# Patient Record
Sex: FEMALE | Race: WHITE | Employment: FULL TIME | URBAN - METROPOLITAN AREA
[De-identification: names, ages, dates, MRNs, and addresses within clinical notes are randomized per-mention and may not be internally consistent; named-entity substitution may affect disease eponyms.]

---

## 2022-02-07 LAB
CHLAMYDIA, EXTERNAL: NEGATIVE
N. GONORRHEA, EXTERNAL: NEGATIVE

## 2022-02-16 LAB
ANTIBODY SCREEN, EXTERNAL: NEGATIVE
HBSAG, EXTERNAL: NEGATIVE
HIV, EXTERNAL: NEGATIVE
RPR, EXTERNAL: NONREACTIVE
RUBELLA, EXTERNAL: NORMAL
TYPE, ABO & RH, EXTERNAL: NORMAL

## 2022-03-02 ENCOUNTER — HOSPITAL ENCOUNTER (OUTPATIENT)
Dept: PERINATAL CARE | Age: 42
Discharge: HOME | End: 2022-03-02
Attending: OBSTETRICS & GYNECOLOGY
Payer: COMMERCIAL

## 2022-03-02 PROCEDURE — 76813 OB US NUCHAL MEAS 1 GEST: CPT | Performed by: OBSTETRICS & GYNECOLOGY

## 2022-04-22 ENCOUNTER — HOSPITAL ENCOUNTER (OUTPATIENT)
Dept: PERINATAL CARE | Age: 42
Discharge: HOME | End: 2022-04-22
Attending: OBSTETRICS & GYNECOLOGY
Payer: COMMERCIAL

## 2022-04-22 PROCEDURE — 76811 OB US DETAILED SNGL FETUS: CPT | Performed by: OBSTETRICS & GYNECOLOGY

## 2022-06-02 ENCOUNTER — HOSPITAL ENCOUNTER (OUTPATIENT)
Dept: PERINATAL CARE | Age: 42
Discharge: HOME | End: 2022-06-02
Attending: OBSTETRICS & GYNECOLOGY
Payer: COMMERCIAL

## 2022-06-02 PROCEDURE — 76816 OB US FOLLOW-UP PER FETUS: CPT | Performed by: OBSTETRICS & GYNECOLOGY

## 2022-07-12 ENCOUNTER — HOSPITAL ENCOUNTER (OUTPATIENT)
Dept: PERINATAL CARE | Age: 42
Discharge: HOME | End: 2022-07-12
Attending: OBSTETRICS & GYNECOLOGY
Payer: COMMERCIAL

## 2022-07-12 PROCEDURE — 76816 OB US FOLLOW-UP PER FETUS: CPT | Performed by: OBSTETRICS & GYNECOLOGY

## 2022-08-23 ENCOUNTER — HOSPITAL ENCOUNTER (OUTPATIENT)
Dept: PERINATAL CARE | Age: 42
Discharge: HOME | End: 2022-08-23
Attending: OBSTETRICS & GYNECOLOGY
Payer: COMMERCIAL

## 2022-08-23 PROCEDURE — 76816 OB US FOLLOW-UP PER FETUS: CPT | Performed by: OBSTETRICS & GYNECOLOGY

## 2022-08-24 LAB — GRBS, EXTERNAL: NEGATIVE

## 2022-08-30 ENCOUNTER — HOSPITAL ENCOUNTER (OUTPATIENT)
Dept: PERINATAL CARE | Age: 42
Discharge: HOME | End: 2022-08-30
Attending: OBSTETRICS & GYNECOLOGY
Payer: COMMERCIAL

## 2022-08-30 PROCEDURE — 76819 FETAL BIOPHYS PROFIL W/O NST: CPT | Performed by: OBSTETRICS & GYNECOLOGY

## 2022-09-08 ENCOUNTER — HOSPITAL ENCOUNTER (OUTPATIENT)
Dept: PERINATAL CARE | Age: 42
Discharge: HOME | End: 2022-09-08
Attending: OBSTETRICS & GYNECOLOGY
Payer: COMMERCIAL

## 2022-09-08 PROCEDURE — 76820 UMBILICAL ARTERY ECHO: CPT | Performed by: OBSTETRICS & GYNECOLOGY

## 2022-09-08 PROCEDURE — 76819 FETAL BIOPHYS PROFIL W/O NST: CPT | Performed by: OBSTETRICS & GYNECOLOGY

## 2022-09-16 ENCOUNTER — HOSPITAL ENCOUNTER (OUTPATIENT)
Dept: PERINATAL CARE | Age: 42
Discharge: HOME | End: 2022-09-16
Attending: OBSTETRICS & GYNECOLOGY
Payer: COMMERCIAL

## 2022-09-16 ENCOUNTER — HOSPITAL ENCOUNTER (INPATIENT)
Age: 42
LOS: 4 days | Discharge: HOME | End: 2022-09-20
Attending: STUDENT IN AN ORGANIZED HEALTH CARE EDUCATION/TRAINING PROGRAM | Admitting: OBSTETRICS & GYNECOLOGY
Payer: COMMERCIAL

## 2022-09-16 PROBLEM — Z34.90 PREGNANCY: Status: ACTIVE | Noted: 2022-09-16

## 2022-09-16 LAB
ABO + RH BLD: NORMAL
ALBUMIN SERPL-MCNC: 2.5 G/DL (ref 3.5–5)
ALBUMIN/GLOB SERPL: 0.7 {RATIO} (ref 1.1–2.2)
ALP SERPL-CCNC: 140 U/L (ref 45–117)
ALT SERPL-CCNC: 22 U/L (ref 12–78)
ANION GAP SERPL CALC-SCNC: 9 MMOL/L (ref 5–15)
AST SERPL-CCNC: 16 U/L (ref 15–37)
BASOPHILS # BLD: 0 K/UL (ref 0–0.1)
BASOPHILS NFR BLD: 0 % (ref 0–1)
BILIRUB SERPL-MCNC: 0.2 MG/DL (ref 0.2–1)
BLOOD GROUP ANTIBODIES SERPL: NORMAL
BUN SERPL-MCNC: 15 MG/DL (ref 6–20)
BUN/CREAT SERPL: 18 (ref 12–20)
CALCIUM SERPL-MCNC: 8.8 MG/DL (ref 8.5–10.1)
CHLORIDE SERPL-SCNC: 112 MMOL/L (ref 97–108)
CO2 SERPL-SCNC: 21 MMOL/L (ref 21–32)
CREAT SERPL-MCNC: 0.85 MG/DL (ref 0.55–1.02)
DIFFERENTIAL METHOD BLD: ABNORMAL
EOSINOPHIL # BLD: 0.1 K/UL (ref 0–0.4)
EOSINOPHIL NFR BLD: 1 % (ref 0–7)
ERYTHROCYTE [DISTWIDTH] IN BLOOD BY AUTOMATED COUNT: 14 % (ref 11.5–14.5)
GLOBULIN SER CALC-MCNC: 3.5 G/DL (ref 2–4)
GLUCOSE SERPL-MCNC: 126 MG/DL (ref 65–100)
HCT VFR BLD AUTO: 34.9 % (ref 35–47)
HGB BLD-MCNC: 12 G/DL (ref 11.5–16)
IMM GRANULOCYTES # BLD AUTO: 0.1 K/UL (ref 0–0.04)
IMM GRANULOCYTES NFR BLD AUTO: 1 % (ref 0–0.5)
LDH SERPL L TO P-CCNC: 105 U/L (ref 81–246)
LYMPHOCYTES # BLD: 1.7 K/UL (ref 0.8–3.5)
LYMPHOCYTES NFR BLD: 18 % (ref 12–49)
MCH RBC QN AUTO: 31.9 PG (ref 26–34)
MCHC RBC AUTO-ENTMCNC: 34.4 G/DL (ref 30–36.5)
MCV RBC AUTO: 92.8 FL (ref 80–99)
MONOCYTES # BLD: 0.6 K/UL (ref 0–1)
MONOCYTES NFR BLD: 6 % (ref 5–13)
NEUTS SEG # BLD: 7.3 K/UL (ref 1.8–8)
NEUTS SEG NFR BLD: 75 % (ref 32–75)
NRBC # BLD: 0 K/UL (ref 0–0.01)
NRBC BLD-RTO: 0 PER 100 WBC
PLATELET # BLD AUTO: 179 K/UL (ref 150–400)
PMV BLD AUTO: 11.9 FL (ref 8.9–12.9)
POTASSIUM SERPL-SCNC: 4 MMOL/L (ref 3.5–5.1)
PROT SERPL-MCNC: 6 G/DL (ref 6.4–8.2)
RBC # BLD AUTO: 3.76 M/UL (ref 3.8–5.2)
SODIUM SERPL-SCNC: 142 MMOL/L (ref 136–145)
SPECIMEN EXP DATE BLD: NORMAL
URATE SERPL-MCNC: 6.6 MG/DL (ref 2.6–6)
WBC # BLD AUTO: 9.8 K/UL (ref 3.6–11)

## 2022-09-16 PROCEDURE — 76818 FETAL BIOPHYS PROFILE W/NST: CPT | Performed by: OBSTETRICS & GYNECOLOGY

## 2022-09-16 PROCEDURE — 75410000002 HC LABOR FEE PER 1 HR: Performed by: OBSTETRICS & GYNECOLOGY

## 2022-09-16 PROCEDURE — 74011250637 HC RX REV CODE- 250/637: Performed by: OBSTETRICS & GYNECOLOGY

## 2022-09-16 PROCEDURE — 85025 COMPLETE CBC W/AUTO DIFF WBC: CPT

## 2022-09-16 PROCEDURE — 83615 LACTATE (LD) (LDH) ENZYME: CPT

## 2022-09-16 PROCEDURE — 86900 BLOOD TYPING SEROLOGIC ABO: CPT

## 2022-09-16 PROCEDURE — 65270000029 HC RM PRIVATE

## 2022-09-16 PROCEDURE — 59200 INSERT CERVICAL DILATOR: CPT | Performed by: OBSTETRICS & GYNECOLOGY

## 2022-09-16 PROCEDURE — 84550 ASSAY OF BLOOD/URIC ACID: CPT

## 2022-09-16 PROCEDURE — 80053 COMPREHEN METABOLIC PANEL: CPT

## 2022-09-16 PROCEDURE — 76816 OB US FOLLOW-UP PER FETUS: CPT | Performed by: OBSTETRICS & GYNECOLOGY

## 2022-09-16 PROCEDURE — 36415 COLL VENOUS BLD VENIPUNCTURE: CPT

## 2022-09-16 RX ORDER — OXYTOCIN/RINGER'S LACTATE 30/500 ML
0-20 PLASTIC BAG, INJECTION (ML) INTRAVENOUS
Status: DISCONTINUED | OUTPATIENT
Start: 2022-09-17 | End: 2022-09-18 | Stop reason: HOSPADM

## 2022-09-16 RX ORDER — CETIRIZINE HCL 10 MG
TABLET ORAL
COMMUNITY

## 2022-09-16 RX ORDER — PROCHLORPERAZINE EDISYLATE 5 MG/ML
10 INJECTION INTRAMUSCULAR; INTRAVENOUS
Status: DISCONTINUED | OUTPATIENT
Start: 2022-09-16 | End: 2022-09-20 | Stop reason: HOSPADM

## 2022-09-16 RX ORDER — ENOXAPARIN SODIUM 100 MG/ML
40 INJECTION SUBCUTANEOUS
COMMUNITY

## 2022-09-16 RX ORDER — BUTORPHANOL TARTRATE 2 MG/ML
2 INJECTION INTRAMUSCULAR; INTRAVENOUS
Status: DISCONTINUED | OUTPATIENT
Start: 2022-09-16 | End: 2022-09-20 | Stop reason: HOSPADM

## 2022-09-16 RX ORDER — ASPIRIN 81 MG/1
TABLET ORAL DAILY
COMMUNITY

## 2022-09-16 RX ADMIN — Medication 25 MCG: at 23:38

## 2022-09-16 RX ADMIN — Medication 25 MCG: at 21:35

## 2022-09-16 NOTE — PROGRESS NOTES
[id number] - Patient arrived to labor and delivery, changing in room. R Matt Reynoso 20 changing and placed on monitor. 1 - Consents signed and placed on chart with birth plan. Questions answered. 1545 - Bedside shift change report given to [address] [place] (oncoming nurse) by Antonino Snyder (offgoing nurse). Report included the following information SBAR.

## 2022-09-16 NOTE — PROGRESS NOTES
1900 Bedside and Verbal shift change report given to Esau Rahman (oncoming nurse) by Bill Leone (offgoing nurse). Report included the following information SBAR, Kardex, Procedure Summary, Intake/Output, MAR, and Recent Results. 2035 RN at bedside. Dr Laura Hwang MD in room to assess pt.     2200 Bedside and Verbal shift change report given to Delmy (oncoming nurse) by Esau Rahman (offgoing nurse). Report included the following information SBAR, Kardex, Procedure Summary, Intake/Output, MAR, and Recent Results.

## 2022-09-16 NOTE — PROGRESS NOTES
[address] received from Rajesh Maloney RN    41 Chris Rock MD at bedside. SVE 1cm. Abdalla bulb placed 60/60. VORB miso 7p, 9pm, 11pm, and pitocin 2mL/hr q30 starting at 0500. MD would like continuous monitoring    1908 Bedside and Verbal shift change report given to Harsh Dickens RN (oncoming nurse) by Jameel Martinez RN  (offgoing nurse). Report included the following information SBAR, MAR, and Med Rec Status.

## 2022-09-16 NOTE — H&P
History & Physical    Name: Maddison Hannon MRN: [id number]  SSN: [id number]    YOB: 1980  Age: 46 y.o. Sex: female      Subjective:     Estimated Date of Delivery: 22  OB History    Para Term  AB Living   1             SAB IAB Ectopic Molar Multiple Live Births                    # Outcome Date GA Lbr Duane/2nd Weight Sex Delivery Anes PTL Lv   1 Current                Ms. Sohail Arce is admitted with pregnancy at 39w4d for induction of labor due to BPP 10 at term. Prenatal course was complicated by  history of epilepsy, CVA, prothrombin G heterozygous mutation, AMA . Pt has been on Lovenox for the pregnancy 40mg once daily. Last dose 9/15 at night. Please see prenatal records for details. Pt seen for routine visit today at MFM and BPP 6/10, minus 2 for fetal movement and NST. Pt feeling good FM now. Past Medical History:   Diagnosis Date    Abnormal Papanicolaou smear of cervix     Disease of blood and blood forming organ     Epilepsy [facility])     Psychiatric problem      Past Surgical History:   Procedure Laterality Date    HX OTHER SURGICAL       Social History     Occupational History    Not on file   Tobacco Use    Smoking status: Never     Passive exposure: Never    Smokeless tobacco: Never   Substance and Sexual Activity    Alcohol use: Never    Drug use: Not on file    Sexual activity: Not on file     No family history on file. No Known Allergies  Prior to Admission medications    Medication Sig Start Date End Date Taking? Authorizing Provider   aspirin delayed-release 81 mg tablet Take  by mouth daily. Yes Provider, Historical   cetirizine (ZyrTEC) 10 mg tablet Take  by mouth. Yes Provider, Historical   enoxaparin (Lovenox) 40 mg/0.4 mL 40 mg by SubCUTAneous route. Yes Provider, Historical        Review of Systems: Pertinent items are noted in the History of Present Illness.     Objective:     Vitals:  Vitals:    22 1457 22 1527   BP: 136/84    Pulse: 100    Resp: 22    Weight:  94.3 kg (208 lb)   Height:  5' 5\" (1.651 m)        Physical Exam:  Close/soft/high  Cook placed 60/60  Membranes:  Intact  Fetal Heart Rate: Reactive          Prenatal Labs:   No results found for: ABORH, RUBELLAEXT, HBSAGEXT, HIVEXT, RPREXT, GONNOEXT, CHLAMEXT, ABORHEXT, RUBELLAEXT, GRBSEXT, HBSAGEXT, HIVEXT, RPREXT, GONNOEXT, CHLAMEXT    Impression/Plan:  IUP at 39w4d IOL for nonreassuring  testing at term     Active Problems:    * No active hospital problems. *       Plan:    Admit for induction of labor. 2.  Group B Strep negative. 3.  Lovenox held, plan restart postpartum  4. Cook placed.   Oral miso 25mg po q 2 hrs x 3 doses then pit at 5am.  Pain meds prn.  5.  Pt not anemic    Brandi Pina DO, [address] [facility],[address] [facility]

## 2022-09-17 LAB
CREAT UR-MCNC: 256 MG/DL
PROT UR-MCNC: 54 MG/DL (ref 0–11.9)
PROT/CREAT UR-RTO: 0.2

## 2022-09-17 PROCEDURE — 84156 ASSAY OF PROTEIN URINE: CPT

## 2022-09-17 PROCEDURE — 74011250637 HC RX REV CODE- 250/637: Performed by: OBSTETRICS & GYNECOLOGY

## 2022-09-17 PROCEDURE — 75410000002 HC LABOR FEE PER 1 HR: Performed by: OBSTETRICS & GYNECOLOGY

## 2022-09-17 PROCEDURE — 74011250636 HC RX REV CODE- 250/636: Performed by: OBSTETRICS & GYNECOLOGY

## 2022-09-17 PROCEDURE — 65270000029 HC RM PRIVATE

## 2022-09-17 RX ORDER — SODIUM CHLORIDE, SODIUM LACTATE, POTASSIUM CHLORIDE, CALCIUM CHLORIDE 600; 310; 30; 20 MG/100ML; MG/100ML; MG/100ML; MG/100ML
125 INJECTION, SOLUTION INTRAVENOUS CONTINUOUS
Status: DISCONTINUED | OUTPATIENT
Start: 2022-09-17 | End: 2022-09-20

## 2022-09-17 RX ORDER — SODIUM CHLORIDE 0.9 % (FLUSH) 0.9 %
5-40 SYRINGE (ML) INJECTION AS NEEDED
Status: DISCONTINUED | OUTPATIENT
Start: 2022-09-17 | End: 2022-09-20 | Stop reason: HOSPADM

## 2022-09-17 RX ORDER — SODIUM CHLORIDE 0.9 % (FLUSH) 0.9 %
5-40 SYRINGE (ML) INJECTION EVERY 8 HOURS
Status: DISCONTINUED | OUTPATIENT
Start: 2022-09-17 | End: 2022-09-20

## 2022-09-17 RX ADMIN — Medication 25 MCG: at 02:37

## 2022-09-17 RX ADMIN — Medication 25 MCG: at 15:03

## 2022-09-17 RX ADMIN — SODIUM CHLORIDE, POTASSIUM CHLORIDE, SODIUM LACTATE AND CALCIUM CHLORIDE 125 ML/HR: 600; 310; 30; 20 INJECTION, SOLUTION INTRAVENOUS at 05:15

## 2022-09-17 RX ADMIN — OXYTOCIN 2 MILLI-UNITS/MIN: 10 INJECTION INTRAVENOUS at 05:17

## 2022-09-17 RX ADMIN — Medication 25 MCG: at 12:53

## 2022-09-17 RX ADMIN — Medication 25 MCG: at 11:07

## 2022-09-17 NOTE — PROGRESS NOTES
Labor Note    [facility]  [id number]  1980   39w5d      S:  Feeling some mild cramping with contractions    O:    Visit Vitals  /68   Pulse 80   Temp 97.8 °F (36.6 °C)   Resp 16   Ht 5' 5\" (1.651 m)   Wt 94.3 kg (208 lb)   SpO2 99%   BMI 34.61 kg/m²     Cervical Exam  Dilation (cm): 2.5  Eff: 90 %  Station: -3  Vaginal exam done by? : Dr. Placido Gonzales Status: Intact    Patient Vitals for the past 4 hrs: Mode Fetal Heart Rate Variability Decelerations Accelerations RN Reviewed Strip?   22 0900 External 140 6-25 BPM None No Yes   22 0845 -- -- -- -- -- Yes   22 0830 External 140 6-25 BPM None No Yes   22 0815 -- -- -- -- -- Yes   22 0800 External 140 6-25 BPM None Yes Yes   22 0745 -- -- -- -- -- Yes   22 0730 External 135 6-25 BPM None Yes Yes   22 0715 -- -- -- -- -- Yes   22 0700 External 130 6-25 BPM None Yes Yes   22 0630 External 135 6-25 BPM None Yes Yes       A/P:  46 y.o.  @ 39w5d- IOL nonreassuring  testing  - hx CVA and prothrombin G mutation on lovenox. Last dose 9/15 pm. SCDs and restart lovenox 12-24hrs post vaginal, 24hrs post cs  1. CEFM/[place]  2. GBS neg / Rh pos  3. Pitocin was at 8. Will stop and give 2 doses of miso  4. Pain control - epidural as needed  5. Ludy 4-6 hours, or prn. Expect .       Yamileth Santiago MD  [facility]

## 2022-09-17 NOTE — PROGRESS NOTES
Labor Progress Note  Patient seen, fetal heart rate and contraction pattern evaluated, patient examined.   Visit Vitals  /89   Pulse 84   Temp 97.8 °F (36.6 °C)   Resp 16   Ht 5' 5\" (1.651 m)   Wt 208 lb (94.3 kg)   SpO2 99%   BMI 34.61 kg/m²       Physical Exam:  Cervical Exam:  2/60 %/-3/  nper nursing  Membranes:  Intact  Uterine Activity: Frequency: Every 3-7 minutes  Fetal Heart Rate: Reactive    Assessment/Plan:  Reassuring fetal status  Patient has declined intervention - declined AROM, replacement of cook  Received Cytotec x 3 - had some mild contractions  Disc options with patient - will rest, allow to eat, restart pitocin at midnite  Disc MD will likely AROM tomororw am - this will give her plenty of time to prepare herself for this procedure        Tanika Bryant MD  [facility] Hospitalist  [facility]

## 2022-09-17 NOTE — PROGRESS NOTES
12: SBAR report received from AGUS Rivers RN.    9407Roselyn Mary balloon deflated and removed without difficulty. 4485: Dr. Talia Blum at bedside. SVE 2.5/90/-3. MD discussing with patient plan to stop Pitocin, eat breakfast, shower, and restart oral miso. Pt in agreement with plan of care. 0930: Pt sitting up in bed eating. Pt plans to shower after breakfast. Notified pt will reapply EFM at 1030 prior to starting oral cytotec. 1030: Pt in shower at this time. 1047: Pt back in bed after shower. EFM reapplied. 1601: Pt assisted to sitting on the birthing ball at side of the bed.     1700: SVE done. Discussed with patient options for management of induction. Advised pt that a repeat cook catheter may be helpful r/t minimal dilation/effacement. Pt declines cook catheter. Pt declines AROM at this time. Pt is amenable to continuing cytotec induction or starting Pitocin. 1705: Dr. Edilberto Medley notified of repeat SVE and no cervical change noted. MD advised of pt's desires r/t method of induction. TORB for pt to eat dinner, shower after 7 p.m. and rest then restart Pitocin at 0000.    1900: SBAR report given to AGUS Rivers RN

## 2022-09-17 NOTE — PROGRESS NOTES
Labor Progress Note  Patient seen, fetal heart rate and contraction pattern evaluated, patient examined. Visit Vitals  /68   Pulse 80   Temp 97.8 °F (36.6 °C)   Resp 16   Ht 5' 5\" (1.651 m)   Wt 94.3 kg (208 lb)   SpO2 99%   BMI 34.61 kg/m²       Physical Exam:  Cervical Exam:  2.5/90 %/-3/  Dr. Fifi Gimenez  Membranes:  Intact  Uterine Activity: Frequency: Every 3-4 minutes  Fetal Heart Rate: Reactive    Assessment/Plan:  Has received one dose of 25mcg cytotec. Disc AROM, epidural   IOL nonreassuring  testing  - hx CVA and prothrombin G mutation on lovenox. Last dose 9/15 pm. SCDs and restart lovenox 12-24hrs post vaginal, 24hrs post cs  1. CEFM/[place]  2.   GBS neg / Rh pos

## 2022-09-18 ENCOUNTER — ANESTHESIA EVENT (OUTPATIENT)
Dept: LABOR AND DELIVERY | Age: 42
End: 2022-09-18
Payer: COMMERCIAL

## 2022-09-18 ENCOUNTER — ANESTHESIA (OUTPATIENT)
Dept: LABOR AND DELIVERY | Age: 42
End: 2022-09-18
Payer: COMMERCIAL

## 2022-09-18 PROBLEM — O36.8390 NON-REASSURING ELECTRONIC FETAL MONITORING TRACING: Status: ACTIVE | Noted: 2022-09-18

## 2022-09-18 LAB
GLUCOSE BLD STRIP.AUTO-MCNC: 90 MG/DL (ref 65–117)
SERVICE CMNT-IMP: NORMAL

## 2022-09-18 PROCEDURE — 74011000250 HC RX REV CODE- 250

## 2022-09-18 PROCEDURE — 76010000392 HC C SECN EA ADDL 0.5 HR: Performed by: OBSTETRICS & GYNECOLOGY

## 2022-09-18 PROCEDURE — 10907ZC DRAINAGE OF AMNIOTIC FLUID, THERAPEUTIC FROM PRODUCTS OF CONCEPTION, VIA NATURAL OR ARTIFICIAL OPENING: ICD-10-PCS | Performed by: OBSTETRICS & GYNECOLOGY

## 2022-09-18 PROCEDURE — 74011000250 HC RX REV CODE- 250: Performed by: ANESTHESIOLOGY

## 2022-09-18 PROCEDURE — 74011250636 HC RX REV CODE- 250/636

## 2022-09-18 PROCEDURE — 82962 GLUCOSE BLOOD TEST: CPT

## 2022-09-18 PROCEDURE — 75410000002 HC LABOR FEE PER 1 HR: Performed by: OBSTETRICS & GYNECOLOGY

## 2022-09-18 PROCEDURE — 65270000029 HC RM PRIVATE

## 2022-09-18 PROCEDURE — 75410000003 HC RECOV DEL/VAG/CSECN EA 0.5 HR: Performed by: OBSTETRICS & GYNECOLOGY

## 2022-09-18 PROCEDURE — 74011000250 HC RX REV CODE- 250: Performed by: OBSTETRICS & GYNECOLOGY

## 2022-09-18 PROCEDURE — 74011250636 HC RX REV CODE- 250/636: Performed by: OBSTETRICS & GYNECOLOGY

## 2022-09-18 PROCEDURE — 77030007866 HC KT SPN ANES BBMI -B: Performed by: ANESTHESIOLOGY

## 2022-09-18 PROCEDURE — 76010000391 HC C SECN FIRST 1 HR: Performed by: OBSTETRICS & GYNECOLOGY

## 2022-09-18 PROCEDURE — 74011250636 HC RX REV CODE- 250/636: Performed by: ANESTHESIOLOGY

## 2022-09-18 PROCEDURE — 76060000078 HC EPIDURAL ANESTHESIA: Performed by: OBSTETRICS & GYNECOLOGY

## 2022-09-18 RX ORDER — IBUPROFEN 800 MG/1
800 TABLET ORAL
Status: DISCONTINUED | OUTPATIENT
Start: 2022-09-18 | End: 2022-09-20 | Stop reason: HOSPADM

## 2022-09-18 RX ORDER — NALOXONE HYDROCHLORIDE 0.4 MG/ML
0.4 INJECTION, SOLUTION INTRAMUSCULAR; INTRAVENOUS; SUBCUTANEOUS AS NEEDED
Status: DISCONTINUED | OUTPATIENT
Start: 2022-09-18 | End: 2022-09-20 | Stop reason: HOSPADM

## 2022-09-18 RX ORDER — NALBUPHINE HYDROCHLORIDE 10 MG/ML
10 INJECTION, SOLUTION INTRAMUSCULAR; INTRAVENOUS; SUBCUTANEOUS
Status: DISCONTINUED | OUTPATIENT
Start: 2022-09-18 | End: 2022-09-18 | Stop reason: HOSPADM

## 2022-09-18 RX ORDER — SIMETHICONE 80 MG
80 TABLET,CHEWABLE ORAL AS NEEDED
Status: DISCONTINUED | OUTPATIENT
Start: 2022-09-18 | End: 2022-09-20 | Stop reason: HOSPADM

## 2022-09-18 RX ORDER — ZOLPIDEM TARTRATE 5 MG/1
5 TABLET ORAL
Status: DISCONTINUED | OUTPATIENT
Start: 2022-09-18 | End: 2022-09-20 | Stop reason: HOSPADM

## 2022-09-18 RX ORDER — OXYTOCIN/RINGER'S LACTATE 30/500 ML
10 PLASTIC BAG, INJECTION (ML) INTRAVENOUS AS NEEDED
Status: DISCONTINUED | OUTPATIENT
Start: 2022-09-18 | End: 2022-09-20 | Stop reason: HOSPADM

## 2022-09-18 RX ORDER — SODIUM CHLORIDE, SODIUM LACTATE, POTASSIUM CHLORIDE, CALCIUM CHLORIDE 600; 310; 30; 20 MG/100ML; MG/100ML; MG/100ML; MG/100ML
125 INJECTION, SOLUTION INTRAVENOUS CONTINUOUS
Status: DISCONTINUED | OUTPATIENT
Start: 2022-09-18 | End: 2022-09-20

## 2022-09-18 RX ORDER — ONDANSETRON 2 MG/ML
4 INJECTION INTRAMUSCULAR; INTRAVENOUS
Status: DISCONTINUED | OUTPATIENT
Start: 2022-09-18 | End: 2022-09-20 | Stop reason: HOSPADM

## 2022-09-18 RX ORDER — DIPHENHYDRAMINE HYDROCHLORIDE 50 MG/ML
12.5 INJECTION, SOLUTION INTRAMUSCULAR; INTRAVENOUS
Status: DISCONTINUED | OUTPATIENT
Start: 2022-09-18 | End: 2022-09-20 | Stop reason: HOSPADM

## 2022-09-18 RX ORDER — HYDROMORPHONE HYDROCHLORIDE 1 MG/ML
1 INJECTION, SOLUTION INTRAMUSCULAR; INTRAVENOUS; SUBCUTANEOUS
Status: DISCONTINUED | OUTPATIENT
Start: 2022-09-18 | End: 2022-09-20 | Stop reason: HOSPADM

## 2022-09-18 RX ORDER — OXYTOCIN/RINGER'S LACTATE 30/500 ML
87.3 PLASTIC BAG, INJECTION (ML) INTRAVENOUS AS NEEDED
Status: DISCONTINUED | OUTPATIENT
Start: 2022-09-18 | End: 2022-09-20 | Stop reason: HOSPADM

## 2022-09-18 RX ORDER — KETOROLAC TROMETHAMINE 30 MG/ML
30 INJECTION, SOLUTION INTRAMUSCULAR; INTRAVENOUS EVERY 6 HOURS
Status: DISCONTINUED | OUTPATIENT
Start: 2022-09-18 | End: 2022-09-20 | Stop reason: HOSPADM

## 2022-09-18 RX ORDER — MORPHINE SULFATE 0.5 MG/ML
INJECTION, SOLUTION EPIDURAL; INTRATHECAL; INTRAVENOUS AS NEEDED
Status: DISCONTINUED | OUTPATIENT
Start: 2022-09-18 | End: 2022-09-18 | Stop reason: HOSPADM

## 2022-09-18 RX ORDER — OXYTOCIN/RINGER'S LACTATE 30/500 ML
PLASTIC BAG, INJECTION (ML) INTRAVENOUS
Status: DISCONTINUED | OUTPATIENT
Start: 2022-09-18 | End: 2022-09-18 | Stop reason: HOSPADM

## 2022-09-18 RX ORDER — OXYCODONE AND ACETAMINOPHEN 5; 325 MG/1; MG/1
1 TABLET ORAL
Status: DISCONTINUED | OUTPATIENT
Start: 2022-09-18 | End: 2022-09-20 | Stop reason: HOSPADM

## 2022-09-18 RX ORDER — SODIUM CHLORIDE 0.9 % (FLUSH) 0.9 %
5-40 SYRINGE (ML) INJECTION EVERY 8 HOURS
Status: DISCONTINUED | OUTPATIENT
Start: 2022-09-18 | End: 2022-09-20

## 2022-09-18 RX ORDER — FENTANYL/BUPIVACAINE/NS/PF 2-1250MCG
10 PREFILLED PUMP RESERVOIR EPIDURAL CONTINUOUS
Status: DISCONTINUED | OUTPATIENT
Start: 2022-09-18 | End: 2022-09-18 | Stop reason: HOSPADM

## 2022-09-18 RX ORDER — SODIUM CHLORIDE 0.9 % (FLUSH) 0.9 %
5-40 SYRINGE (ML) INJECTION AS NEEDED
Status: DISCONTINUED | OUTPATIENT
Start: 2022-09-18 | End: 2022-09-20 | Stop reason: HOSPADM

## 2022-09-18 RX ORDER — PHENYLEPHRINE HCL IN 0.9% NACL 0.4MG/10ML
SYRINGE (ML) INTRAVENOUS AS NEEDED
Status: DISCONTINUED | OUTPATIENT
Start: 2022-09-18 | End: 2022-09-18 | Stop reason: HOSPADM

## 2022-09-18 RX ORDER — EPHEDRINE SULFATE/0.9% NACL/PF 50 MG/5 ML
10 SYRINGE (ML) INTRAVENOUS
Status: DISCONTINUED | OUTPATIENT
Start: 2022-09-18 | End: 2022-09-18 | Stop reason: HOSPADM

## 2022-09-18 RX ORDER — EPHEDRINE SULFATE/0.9% NACL/PF 50 MG/5 ML
SYRINGE (ML) INTRAVENOUS AS NEEDED
Status: DISCONTINUED | OUTPATIENT
Start: 2022-09-18 | End: 2022-09-18 | Stop reason: HOSPADM

## 2022-09-18 RX ORDER — BUPIVACAINE HYDROCHLORIDE 7.5 MG/ML
INJECTION, SOLUTION EPIDURAL; RETROBULBAR AS NEEDED
Status: DISCONTINUED | OUTPATIENT
Start: 2022-09-18 | End: 2022-09-18 | Stop reason: HOSPADM

## 2022-09-18 RX ORDER — ACETAMINOPHEN 325 MG/1
650 TABLET ORAL
Status: DISCONTINUED | OUTPATIENT
Start: 2022-09-18 | End: 2022-09-20 | Stop reason: HOSPADM

## 2022-09-18 RX ADMIN — BUPIVACAINE HYDROCHLORIDE 1.5 ML: 7.5 INJECTION, SOLUTION EPIDURAL; RETROBULBAR at 17:29

## 2022-09-18 RX ADMIN — Medication 500 ML/HR: at 17:54

## 2022-09-18 RX ADMIN — OXYTOCIN 14 MILLI-UNITS/MIN: 10 INJECTION INTRAVENOUS at 10:28

## 2022-09-18 RX ADMIN — Medication 80 MCG: at 18:01

## 2022-09-18 RX ADMIN — Medication 15 MG: at 17:38

## 2022-09-18 RX ADMIN — OXYTOCIN 2 MILLI-UNITS/MIN: 10 INJECTION INTRAVENOUS at 00:31

## 2022-09-18 RX ADMIN — SODIUM CHLORIDE, POTASSIUM CHLORIDE, SODIUM LACTATE AND CALCIUM CHLORIDE 125 ML/HR: 600; 310; 30; 20 INJECTION, SOLUTION INTRAVENOUS at 08:10

## 2022-09-18 RX ADMIN — SODIUM CHLORIDE, POTASSIUM CHLORIDE, SODIUM LACTATE AND CALCIUM CHLORIDE 125 ML/HR: 600; 310; 30; 20 INJECTION, SOLUTION INTRAVENOUS at 00:28

## 2022-09-18 RX ADMIN — MORPHINE SULFATE 250 MCG: 0.5 INJECTION, SOLUTION EPIDURAL; INTRATHECAL; INTRAVENOUS at 17:29

## 2022-09-18 RX ADMIN — SODIUM CHLORIDE, POTASSIUM CHLORIDE, SODIUM LACTATE AND CALCIUM CHLORIDE 125 ML/HR: 600; 310; 30; 20 INJECTION, SOLUTION INTRAVENOUS at 12:15

## 2022-09-18 RX ADMIN — ONDANSETRON 4 MG: 2 INJECTION INTRAMUSCULAR; INTRAVENOUS at 21:40

## 2022-09-18 RX ADMIN — SODIUM CHLORIDE, POTASSIUM CHLORIDE, SODIUM LACTATE AND CALCIUM CHLORIDE 125 ML/HR: 600; 310; 30; 20 INJECTION, SOLUTION INTRAVENOUS at 23:42

## 2022-09-18 RX ADMIN — SODIUM CHLORIDE, PRESERVATIVE FREE 10 ML: 5 INJECTION INTRAVENOUS at 00:25

## 2022-09-18 RX ADMIN — KETOROLAC TROMETHAMINE 30 MG: 30 INJECTION, SOLUTION INTRAMUSCULAR at 20:18

## 2022-09-18 RX ADMIN — CEFAZOLIN 2 G: 1 INJECTION, POWDER, FOR SOLUTION INTRAMUSCULAR; INTRAVENOUS at 17:00

## 2022-09-18 NOTE — ANESTHESIA PROCEDURE NOTES
Spinal Block    Start time: 9/18/2022 5:19 PM  End time: 9/18/2022 5:30 PM  Performed by: Alda Agrawal DO  Authorized by: Alad Agrawal DO     Pre-procedure: Indications: primary anesthetic  Preanesthetic Checklist: patient identified, risks and benefits discussed, anesthesia consent, patient being monitored and timeout performed    Timeout Time: 17:16 EDT      Spinal Block:   Patient Position:  Seated  Prep Region:  Lumbar  Prep: DuraPrep and patient draped      Location:  L3-4  Technique:  Single shot      Med Admin Time: 9/18/2022 5:29 PM    Needle:   Needle Type:  Pencan  Needle Gauge:  25 G  Attempts:  3      Events: CSF confirmed and no paresthesia        Assessment:    Patient tolerance:  Patient tolerated the procedure well with no immediate complications  Two attempts using Brii 27G  at L3-4 and L2-3 with bony palpation only.   Repositioned and easy attempt at L3-4

## 2022-09-18 NOTE — L&D DELIVERY NOTE
Delivery Summary    Patient: Wilmer Fofana MRN: [id number]  SSN: [id number]    YOB: 1980  Age: 46 y.o. Sex: female        Information for the patient's :  Halle Ruiz, Female Laury Gaucher [[id number]]     Labor Events:    Labor: No    Steroids: None   Cervical Ripening Date/Time: 2022 4:58 PM   Cervical Ripening Type: Abdalla/EASI   Antibiotics During Labor: No   Rupture Identifier: Sac 1    Rupture Date/Time: 2022 9:08 AM   Rupture Type: AROM   Amniotic Fluid Volume:  Moderate    Amniotic Fluid Description: Clear    Amniotic Fluid Odor: None    Induction: Abdalla Bulb (balloon)       Induction Date/Time: 2022 4:58 PM    Indications for Induction: Other(comment)    Augmentation: Oxytocin   Augmentation Date/Time: 25:17 AM   Indications for Augmentation: Ineffective Contraction Pattern   Labor complications: Dysfunctional Labor       Additional complications:        Delivery Events:  Indications For Episiotomy:     Episiotomy: None   Perineal Laceration(s): None   Repaired:     Periurethral Laceration Location:      Repaired:     Labial Laceration Location:     Repaired:     Sulcal Laceration Location:     Repaired:     Vaginal Laceration Location:     Repaired:     Cervical Laceration Location:     Repaired:     Repair Suture: None   Number of Repair Packets: 0   Estimated Blood Loss (ml):  ml   Quantitaive Blood Loss (ml):             Delivery Date: 2022    Delivery Time: 5:52 PM   Delivery Type: , Low Transverse     Details    Trial of Labor: Yes   Primary/Repeat: Primary   Priority: Routine   Indications:  Fetal Intolerance of Labor       Sex:  Female     Gestational Age: 37w11d  Delivery Clinician:  Tamara Galan Ii  Living Status: Living   Delivery Location: L&D            APGARS  One minute Five minutes Ten minutes   Skin color: 1   1        Heart rate: 2   2        Grimace: 2   2        Muscle tone: 2   2        Breathin   2 Totals: 9   9          Presentation: Vertex    Position: Left Occiput Anterior  Resuscitation Method:  Suctioning-bulb; Tactile Stimulation     Meconium Stained: None      Cord Information: 3 Vessels  Complications: None  Cord around:    Delayed cord clamping? Yes  Cord clamped date/time:2022  5:53 PM  Disposition of Cord Blood: Discard    Blood Gases Sent?: No    Placenta:  Date/Time: 2022  5:55 PM  Removal: Manual Removal      Appearance: Normal     [place] Measurements:  Birth Weight: 6 lb 11.4 oz (3.045 kg)      Birth Length: 1' 7\" (0.483 m)      Head Circumference: 1' 0.21\" (0.31 m)      Chest Circumference: 1' 0.4\" (0.315 m)     Abdominal Girth: 11.81\" (0.3 m)    Other Providers:   DANG Rider;ZULEMA SERRANO;JOSE EM;DANYELLE CHIANG;SONY BILLINSGLEY;MARQUIS HERNANDES, Obstetrician;Primary Nurse;Primary [place] Nurse; Anesthesiologist;Scrub Tech;Charge Nurse;Staff Nurse         Group B Strep:   Lab Results   Component Value Date/Time    GrBStrep, External negative 2022 12:00 AM     Information for the patient's :  Luis Fernando Giraldo, Female Lilia Schmid [[id number]]   No results found for: ABORH, PCTABR, PCTDIG, BILI, ABORHEXT, ABORH   No results for input(s): PCO2CB, PO2CB, HCO3I, SO2I, IBD, PTEMPI, SPECTI, PHICB, ISITE, IDEV, IALLEN in the last 72 hours.

## 2022-09-18 NOTE — PROGRESS NOTES
Pitocin was restarted after previous decels resolved. Baby had a prolonged decel for 7 minutes & pitocin again Dc'd. Baby has now recovered. Explained to patient that the baby is not tolerating labor and she is remote from delivery. Explained CS is the best and safest mode of delivery. Parents are in agreement. Procedure discussed & questions answered. Will proceed with primary LTCS.

## 2022-09-18 NOTE — ROUTINE PROCESS
1900  Bedside and Verbal shift change report given to NEGRO Beasley RN (oncoming nurse) by Jf Begum RN (offgoing nurse). Report included the following information SBAR, Kardex, OR Summary, Procedure Summary, Intake/Output, MAR, Recent Results, and Med Rec Status. 1905  Patient assessment. TOTAL  ml  2205  TRANSFER - OUT REPORT:    Verbal report given to Shai BESS(name) on Mario Sanford  being transferred to MIU(unit) for routine progression of care       Report consisted of patients Situation, Background, Assessment and   Recommendations(SBAR). Information from the following report(s) SBAR, Kardex, OR Summary, Procedure Summary, Intake/Output, MAR, Recent Results, and Med Rec Status was reviewed with the receiving nurse. Lines:   Peripheral IV 09/18/22 Posterior;Right Hand (Active)   Site Assessment Clean, dry, & intact 09/18/22 0756   Phlebitis Assessment 0 09/18/22 0756   Infiltration Assessment 0 09/18/22 0756   Dressing Status Clean, dry, & intact 09/18/22 0756   Dressing Type Tape;Transparent 09/18/22 0756   Hub Color/Line Status Pink; Infusing 09/18/22 0756   Action Taken Open ports on tubing capped 09/18/22 0756   Alcohol Cap Used Yes 09/18/22 0756        Opportunity for questions and clarification was provided.       Patient transported with:   Registered Nurse

## 2022-09-18 NOTE — PROGRESS NOTES
0700 - Verbal shift change report given to [facility], RN (oncoming nurse) by Nanci Bella RN (offgoing nurse). Report included the following information SBAR, Kardex, Intake/Output, MAR, and Recent Results. 5721 - SVE by Dr. Nacho Roberts. Patient 2.5 cm.    8505 - AROM by Dr. Nacho Roberts. Clear, moderate amount of fluid. IUPC inserted. 1145 - Prolonged late deceleration noted on FHR strip. Patient turned onto side, fluid bolus given, and Pitocin decreased by half. Dr. Nacho Roberts notified. 1215 - Prolonged late deceleration noted on FHR strip. Patient turned onto other side. Pitocin turned off. Dr. Nacho Roberts notified. FSE placed by Dr. Marisela Montiel and SVE performed. Patient unchanged from 2.5 cm.    1502 - Pitocin restarted at 1 mL/hr.    1630 - Patient unable to tolerate labor. C-section called by Dr. Marisela Montiel. 1705 - Patient wheeled to OR in bed.    1752 - Viable delivery of female infant. See delivery summary. 1830 - Patient back in room with baby. Delivery QBL = 400 mL    1900 - Verbal shift change report given to Sha Kirkland RN (oncoming nurse) by [facility], RN (offgoing nurse). Report included the following information SBAR, Kardex, OR Summary, Procedure Summary, Intake/Output, MAR, and Recent Results.

## 2022-09-18 NOTE — OP NOTES
Section Procedure Note         Name: Cele Penn      Medical Record Number: [id number]      YOB: 1980     Today's Date: 2022      Preoperative Diagnosis: Fetal Intolerance    Postoperative Diagnosis: same  Term born live female infant    Procedure: Low Cervical Transverse Procedure(s):   SECTION    Surgeon:  Maksim Clinton MD     Assistant:  Staff    Anesthesia: Spinal    Prophylactic Antibiotics: Ancef    Fetal Description: rainey female    Birth Information:   Information for the patient's :  Rohit Benitez, Female Genora Lot [[id number]]      Umbilical Cord: normal    Placenta:  manual    Specimens: * No specimens in log *     Implants:  None           Complications:  none    EBL: 400    Procedure Details: The patient was prepped with Betadine and draped in the supine position with a spinal  anesthetic. Abdalla catheter had been placed using sterile technique. A Pfannenstiel incision was made. The fascia was incised sharply and extended out bilaterally. The rectus muscles were  in the midline. The peritoneum was entered without difficulty. An Gucci O-ring retractor was placed. The peritoneum over the lower uterine segment was incised and the bladder flap developed and dissected downward. The lower uterine segment was incised sharply with a knife. The Chorio-amniotic membranes were ruptured and small amount clear fluid was encountered. The infant was then delivered onto the operative field and the mouth and nose bulb suctioned. After a 60 second delay, the cord was clamped and cut and the infant handed off to the pediatric team in attendance. The placenta was delivered manually; it was normal and intact. It was not sent to pathology. The uterine cavity was cleaned with a lap pad x2. Pitocin was given IV by anesthesia. The uterine incision was closed with a running interlocked stitch of 0-Vicryl.   A second 0 Vicryl layer was placed to imbricate. Hemostasis was excellent. The abdomen was irrigated with copious amounts of sterile saline and remaining blood and fluid was suctioned from the abdomen. The anterior peritoneum and rectus muscles were reapproximated in the midline with a horizontal mattress suture of 2-0 Vicryl. The fascia was then closed with a running stitch of 0-Vicryl. Remaining bleeders were Bovie cauterized until hemostasis was achieved. The subcutaneous space was irrigated and patted dry. It was noted to be hemostatic. The skin was closed with a 3-0 monocryl subcuticular suture. Dermabond was applied. The final sponge and instrument counts were correct. The patient and infant were taken to the recovery room in good condition.     Signed By:  Bonny Adams MD     September 18, 2022

## 2022-09-18 NOTE — PROGRESS NOTES
Labor Note    Adonay Vuong  [id number]  1980   39w6d      S:  Feeling mild cramping. Received multiple doses of miso over the last 36wks. On 8 pitocin without cervical change. O:    Visit Vitals  /78 (BP 1 Location: Right upper arm, BP Patient Position: At rest)   Pulse 79   Temp 97.9 °F (36.6 °C)   Resp 16   Ht 5' 5\" (1.651 m)   Wt 94.3 kg (208 lb)   SpO2 100%   BMI 34.61 kg/m²     Cervical Exam  Dilation (cm): 2.5  Eff: 60 %  Station: -3  Vaginal exam done by? : Dr. Wade Sahni Status: AROM    Patient Vitals for the past 4 hrs: Mode Fetal Heart Rate Variability Decelerations Accelerations RN Reviewed Strip? FHR Interventions   22 0945 -- 130 -- -- -- Yes --   22 0930 External 130 6-25 BPM None Yes Yes --   22 0915 -- 130 -- -- -- Yes --   22 0900 External 135 6-25 BPM None Yes Yes --   22 0859 -- -- -- -- -- -- Vaginal Exam   22 0845 -- 135 -- -- -- Yes --   22 0830 External 130 6-25 BPM None No Yes --   22 0815 -- 135 -- -- -- Yes --   22 0745 -- 135 -- -- -- Yes --   22 0730 External 130 6-25 BPM None Yes Yes --   22 0715 -- 135 -- -- -- Yes --   22 0700 External 135 6-25 BPM None Yes Yes --   22 0630 External 120 6-25 BPM None Yes Yes --   22 0615 External 125 6-25 BPM None Yes Yes --       A/P:  46 y.o.  @ 39w6d- IOL     - AROM and IUPC placed.  Increase pitocin per protocol  - epidural when ready    Nadia Grayson MD  [facility]

## 2022-09-18 NOTE — ANESTHESIA PREPROCEDURE EVALUATION
Relevant Problems   No relevant active problems       Anesthetic History   No history of anesthetic complications            Review of Systems / Medical History  Patient summary reviewed and nursing notes reviewed    Pulmonary  Within defined limits                 Neuro/Psych         TIA    Comments:  shunt: hydrocephalus Cardiovascular                  Exercise tolerance: >4 METS  Comments: Factor II on ASA during pregnancy   GI/Hepatic/Renal  Within defined limits              Endo/Other  Within defined limits           Other Findings              Physical Exam    Airway  Mallampati: II    Neck ROM: normal range of motion   Mouth opening: Normal     Cardiovascular    Rhythm: regular  Rate: normal         Dental  No notable dental hx       Pulmonary  Breath sounds clear to auscultation               Abdominal         Other Findings            Anesthetic Plan    ASA: 3  Anesthesia type: spinal          Induction: Intravenous  Anesthetic plan and risks discussed with: Patient      Informed consent obtained.

## 2022-09-19 LAB
BASOPHILS # BLD: 0 K/UL (ref 0–0.1)
BASOPHILS NFR BLD: 0 % (ref 0–1)
DIFFERENTIAL METHOD BLD: ABNORMAL
EOSINOPHIL # BLD: 0 K/UL (ref 0–0.4)
EOSINOPHIL NFR BLD: 0 % (ref 0–7)
ERYTHROCYTE [DISTWIDTH] IN BLOOD BY AUTOMATED COUNT: 14.2 % (ref 11.5–14.5)
HCT VFR BLD AUTO: 31.9 % (ref 35–47)
HGB BLD-MCNC: 10.8 G/DL (ref 11.5–16)
IMM GRANULOCYTES # BLD AUTO: 0.1 K/UL (ref 0–0.04)
IMM GRANULOCYTES NFR BLD AUTO: 1 % (ref 0–0.5)
LYMPHOCYTES # BLD: 1.4 K/UL (ref 0.8–3.5)
LYMPHOCYTES NFR BLD: 11 % (ref 12–49)
MCH RBC QN AUTO: 32 PG (ref 26–34)
MCHC RBC AUTO-ENTMCNC: 33.9 G/DL (ref 30–36.5)
MCV RBC AUTO: 94.4 FL (ref 80–99)
MONOCYTES # BLD: 0.6 K/UL (ref 0–1)
MONOCYTES NFR BLD: 5 % (ref 5–13)
NEUTS SEG # BLD: 10 K/UL (ref 1.8–8)
NEUTS SEG NFR BLD: 83 % (ref 32–75)
NRBC # BLD: 0 K/UL (ref 0–0.01)
NRBC BLD-RTO: 0 PER 100 WBC
PLATELET # BLD AUTO: 146 K/UL (ref 150–400)
PMV BLD AUTO: 12.1 FL (ref 8.9–12.9)
RBC # BLD AUTO: 3.38 M/UL (ref 3.8–5.2)
WBC # BLD AUTO: 12.1 K/UL (ref 3.6–11)

## 2022-09-19 PROCEDURE — 65270000029 HC RM PRIVATE

## 2022-09-19 PROCEDURE — 36415 COLL VENOUS BLD VENIPUNCTURE: CPT

## 2022-09-19 PROCEDURE — 74011250637 HC RX REV CODE- 250/637: Performed by: OBSTETRICS & GYNECOLOGY

## 2022-09-19 PROCEDURE — 2709999900 HC NON-CHARGEABLE SUPPLY

## 2022-09-19 PROCEDURE — 85025 COMPLETE CBC W/AUTO DIFF WBC: CPT

## 2022-09-19 PROCEDURE — 74011250636 HC RX REV CODE- 250/636: Performed by: OBSTETRICS & GYNECOLOGY

## 2022-09-19 RX ORDER — ENOXAPARIN SODIUM 100 MG/ML
40 INJECTION SUBCUTANEOUS EVERY 24 HOURS
Status: DISCONTINUED | OUTPATIENT
Start: 2022-09-19 | End: 2022-09-20 | Stop reason: HOSPADM

## 2022-09-19 RX ADMIN — KETOROLAC TROMETHAMINE 30 MG: 30 INJECTION, SOLUTION INTRAMUSCULAR at 11:01

## 2022-09-19 RX ADMIN — KETOROLAC TROMETHAMINE 30 MG: 30 INJECTION, SOLUTION INTRAMUSCULAR at 17:05

## 2022-09-19 RX ADMIN — ONDANSETRON 4 MG: 2 INJECTION INTRAMUSCULAR; INTRAVENOUS at 01:37

## 2022-09-19 RX ADMIN — SODIUM CHLORIDE, POTASSIUM CHLORIDE, SODIUM LACTATE AND CALCIUM CHLORIDE 125 ML/HR: 600; 310; 30; 20 INJECTION, SOLUTION INTRAVENOUS at 08:30

## 2022-09-19 RX ADMIN — KETOROLAC TROMETHAMINE 30 MG: 30 INJECTION, SOLUTION INTRAMUSCULAR at 02:13

## 2022-09-19 RX ADMIN — ENOXAPARIN SODIUM 40 MG: 100 INJECTION SUBCUTANEOUS at 21:56

## 2022-09-19 RX ADMIN — IBUPROFEN 800 MG: 800 TABLET, FILM COATED ORAL at 23:01

## 2022-09-19 RX ADMIN — SODIUM CHLORIDE, POTASSIUM CHLORIDE, SODIUM LACTATE AND CALCIUM CHLORIDE 500 ML: 600; 310; 30; 20 INJECTION, SOLUTION INTRAVENOUS at 09:45

## 2022-09-19 NOTE — DISCHARGE INSTRUCTIONS
Discharge Instructions for  Section    Patient ID:  Wilmer Fofana  [id number]  72 y.o.  1980    Continue taking your prenatal vitamins if you are breastfeeding. Follow-up care is a key part of your treatment and safety. Be sure to keep all your scheduled appointments    Activity  Avoid heavy lifting greater than 10lbs for 2 weeks after your surgery  Pelvic rest for 6 weeks, ie, nothing in your vagina for 6 weeks  (no intercourse, tampons, or douching). No tubs for 6 weeks. No driving for 2 weeks and until you are no longer taking prescription pain medications and you can put your foot on the break in a hurry    Limit climbing stairs to only when necessary the first 1-2 weeks. Hold the railing and do not carry your baby up and downstairs at first for safety   You may walk as tolerated, though be care to not over-do it. Walking will assist in overall healing, decrease constipation and bloating. Abbey Camacho feel better more quickly with some daily movement. Diet  Regular diet as tolerated    Wound care  There are several types of incision closures. If you have metal staples in place when you leave the hospital, please call your doctors office to schedule the staple removal as directed at discharge. If you have steri strips covering your incision, you may remove them as they fall off or be sure to remove them about one week after your surgery. Removing them in the shower may be easier. If you have Dermabond, or skin glue, covering your incision, it will fall off slowly in the next few weeks. You may remove it as it begins to peel off. Additional wound care  Clear or reddish drainage from your incision is normal.  It's best to leave it open to the air, but if there is drainage, you may cover the incision lightly with gauze, preferably without tape. Keep the incision clean and dry. Numbness of the skin at or around your incision is normal and the feeling usually returns gradually. Call your doctor if you have increasing drainage from your incision, an unpleasant odor, red streaks, an increase in pain, or if it appears to open. Pain Management  Continue prescription pain medication as written by discharging physician  Over the counter medications such as Tylenol and ibuprofen (Motrin or Advil) are also ideal.  These may be taken together or in alternating doses. You may  take the maximum dose:  Motrin or Advil (generic ibuprofen), either 3 tablets every 6 hours or 4 tablets every 8 hours. Your prescription medication conntains a narcotic mixed with Tylenol,so  you should not take any extra Tylenol or acetaminophen until you have reduced your prescription pain medication. The maximum dose is Tylenol or acetominophen 1000 mg every 6 hours (equivalent to 2 Extra Strength Tylenols every 6 hours). After a few days, begin to replace the prescription medication with over the counter medications. Use the prescription medication if needed for more severe pain or at night. The prescription medication can be addictive if overused. Add heating pad or sitz baths as needed. Constipation  Constipation is normal after surgery, especially while taking prescription narcotic pain medication. Over the counter remedies including ducosate (Colace), take 1-2 capsules 1-2 times daily for soft stool as needed. You may also add/ try milk of magnesia or rectal remedies such as Dulcolax or Fleets enema. Recovery: What to Expect at Home  Fatigue is expected. Try to rest when you can and don't worry about doing housework or other tasks which can wait. The soreness in your incision will improve significantly over the first 2 weeks, but it may take 6-8 weeks before you are completely recovered. Back pain or general body aches or muscle soreness are expected and should improve with acetominophen or ibuprofen.   Leg swelling due to pregnancy and/or IV fluids given in the hospital will take about two weeks to resolve. Most women experience some form of the \"Baby Blues\" after having a baby. Feeling emotional, tearful, frustrated, anxious, sad, and irritable some of the time is normal and go away after about 2 weeks. Adequate rest and help from your family will help. Take breaks from caring for the baby. Call your doctor if your symptoms seem severe, last more than 2 weeks, or seem to be getting worse instead of better. Get help immediately if you have thoughts of wanting to hurt yourself or others! Call your doctor or seek immediate medical care if you have:  Heavy vaginal bleeding, soaking through one or more pads an hour for several hours. Foul-smelling discharge from your vagina or incision. Consistent nausea and vomiting and cannot keep fluids down. Consistent pain that does not get better after you take pain medicine. Sudden chest pain and shortness of breath  Signs of a blood clot: pain/ swelling/ increasing redness in your lower extremeties  Signs of infection: increased pain in your abdomen or vaginal area; red streaks, warmth, or tenderness of your breasts; fever of 100.5 F or greater    Delivery Type:   Section: What to Expect at Home    Your Recovery:  A  section, or , is surgery to deliver your baby through a cut, called an incision that the doctor makes in your lower belly and uterus. You may have some pain in your lower belly and need pain medicine for 1 to 2 weeks. You can expect some vaginal bleeding for several weeks. You will probably need about 6 weeks to fully recover. It is important to take it easy while the incision is healing. Avoid heavy lifting, strenuous activities, or exercises that strain the belly muscles while you are recovering. Ask a family member or friend for help with housework, cooking, and shopping. This care sheet gives you a general idea about how long it will take for you to recover.  But each person recovers at a different pace. Follow the steps below to get better as quickly as possible. How can you care for yourself at home? Activity  Rest when you feel tired. Getting enough sleep will help you recover. Try to walk each day. Start by walking a little more than you did the day before. Bit by bit, increase the amount you walk. Walking boosts blood flow and helps prevent pneumonia, constipation, and blood clots. Avoid strenuous activities, such as bicycle riding, jogging, weightlifting, and aerobic exercise, for 6 weeks or until your doctor says it is okay. Until your doctor says it is okay, do not lift anything heavier than your baby. Do not do sit-ups or other exercise that strain the belly muscles for 6 weeks or until your doctor says it is okay. Hold a pillow over your incision when you cough or take deep breaths. This will support your belly and decrease your pain. You may shower as usual. Pat the incision dry when you are done. You will have some vaginal bleeding. Wear sanitary pads. Do not douche or use tampons until your doctor says it is okay. Ask your doctor when you can drive again. You will probably need to take at least 6 weeks off work. It depends on the type of work you do and how you feel. Wait until you are healed (about 4 to 6 weeks) before you have sexual intercourse. Your doctor will tell you when it is okay to have sex. Talk to your doctor about birth control. You can get pregnant even before your period returns. Also, you can get pregnant while you are breast-feeding. Incision care  Your skin is your body's first line of defense against germs, but an incision site leaves an easy way for germs to enter your body. To prevent infection:  Clean your hands frequently and before and after changing any touching any dressings. If you have strips of tape on the incision, leave the tape on for a week or until it falls off. Look at your incision closely every day for any changes.  Contact your doctor if you experience any signs of infection, such as fever or increased redness at the surgical site. Wash the area daily with warm, soapy water, and pat it dry. Don't use hydrogen peroxide or alcohol, which can slow healing. You may cover the area with a gauze bandage if it weeps or rubs against clothing. Change the bandage every day. Keep the area clean and dry. Diet  You can eat your normal diet. If your stomach is upset, try bland, low-fat foods like plain rice, broiled chicken, toast, and yogurt. Drink plenty of fluids (unless your doctor tells you not to). You may notice that your bowel movements are not regular right after your surgery. This is common. Try to avoid constipation and straining with bowel movements. You may want to take a fiber supplement every day. If you have not had a bowel movement after a couple of days, ask your doctor about taking a mild laxative. If you are breast-feeding, do not drink any alcohol. Medicines  Take pain medicines exactly as directed. If the doctor gave you a prescription medicine for pain, take it as prescribed. If you are not taking a prescription pain medicine, ask your doctor if you can take an over-the-counter medicine such as acetaminophen (Tylenol), ibuprofen (Advil, Motrin), or naproxen (Aleve), for cramps. Read and follow all instructions on the label. Do not take aspirin, because it can cause more bleeding. Do not take acetaminophen (Tylenol) and other acetaminophen containing medications (i.e. Percocet) at the same time. If you think your pain medicine is making you sick to your stomach: Take your medicine after meals (unless your doctor has told you not to). Ask your doctor for a different pain medicine. If your doctor prescribed antibiotics, take them as directed. Do not stop taking them just because you feel better. You need to take the full course of antibiotics.     Mental Health  Many women get the \"baby blues\" during the first few days after childbirth. You may lose sleep, feel irritable, and cry easily. You may feel happy one minute and sad the next. Hormone changes are one cause of these emotional changes. Also, the demands of a new baby, along with visits from relatives or other family needs, add to a mother's stress. The \"baby blues\" often peak around the fourth day. Then they ease up in less than 2 weeks. If your moodiness or anxiety lasts for more than 2 weeks, or if you feel like life is not worth living, you may have postpartum depression. This is different for each mother. Some mothers with serious depression may worry intensely about their infant's well-being. Others may feel distant from their child. Some mothers might even feel that they might harm their baby. A mother may have signs of paranoia, wondering if someone is watching her. With all the changes in your life, you may not know if you are depressed. Pregnancy sometimes causes changes in how you feel that are similar to the symptoms of depression. Symptoms of depression include:  Feeling sad or hopeless and losing interest in daily activities. These are the most common symptoms of depression. Sleeping too much or not enough. Feeling tired. You may feel as if you have no energy. Eating too much or too little. [employer]PART[employer] (PSI) offers a Warm line; Chat with the Expert phone sessions; Information and Articles about Pregnancy and Postpartum Mood Disorders; Comprehensive List of Free Support Groups; Knowledgeable local coordinators who will offer support, information, and resources; Guide to Resources on Incluyeme.com; Calendar of events in the  mood disorders community; Latest News and Research; and [facility] [address] for [employer]. Remember - You are not alone; You are not to blame; With help, you will be well. 6-758-652-PPD(9367). WWW. POSTPARTUM. NET   Writing or talking about death, such as writing suicide notes or talking about guns, knives, or pills. Keep the numbers for these national suicide hotlines: 2-007-664-TALK (4-838.489.5799) and 3-126-QJTLNII (5-207.942.2454). If you or someone you know talks about suicide or feeling hopeless, get help right away. Other instructions  If you breast-feed your baby, you may be more comfortable while you are healing if you place the baby so that he or she is not resting on your belly. Try tucking your baby under your arm, with his or her body along the side you will be feeding on. Support your baby's upper body with your arm. With that hand you can control your baby's head to bring his or her mouth to your breast. This is sometimes called the football hold. Follow-up care is a key part of your treatment and safety. Be sure to make and go to all appointments, and call your doctor if you are having problems. It's also a good idea to know your test results and keep a list of the medicines you take. When should you call for help? Call 911 anytime you think you may need emergency care. For example, call if:  You are thinking of hurting yourself, your baby, or anyone else. You passed out (lost consciousness). You have symptoms of a blood clot in your lung (called a pulmonary embolism). These may include:  Sudden chest pain. Trouble breathing. Coughing up blood. Call your doctor now or seek immediate medical care if:    You have severe vaginal bleeding. You are soaking through a pad each hour for 2 or more hours. Your vaginal bleeding seems to be getting heavier or is still bright red 4 days after delivery. You are dizzy or lightheaded, or you feel like you may faint. You are vomiting or cannot keep fluids down. You have a fever. You have new or more belly pain. You have loose stitches, or your incision comes open. You have symptoms of infection, such as: Increased pain, swelling, warmth, or redness. Red streaks leading from the incision. Pus draining from the incision.   A fever  You pass tissue (not just blood). Your vaginal discharge smells bad. Your belly feels tender or full and hard. Your breasts are continuously painful or red. You feel sad, anxious, or hopeless for more than a few days. You have sudden, severe pain in your belly. You have symptoms of a blood clot in your leg (called a deep vein thrombosis), such as:  Pain in your calf, back of the knee, thigh, or groin. Redness and swelling in your leg or groin. You have symptoms of preeclampsia, such as:  Sudden swelling of your face, hands, or feet. New vision problems (such as dimness or blurring). A severe headache. Your blood pressure is higher than it should be or rises suddenly. You have new nausea or vomiting. Watch closely for changes in your health, and be sure to contact your doctor if you have any problems. Additional Information:  Preventing Infection at Home    We care about preventing infection and avoiding the spread of germs - not only when you are in the hospital but also when you return home. When you return home from the hospital, it's important to take the following steps to help prevent infection and avoid spreading germs that could infect you and others. Ask everyone in your home to follow these guidelines, too. Clean Your Hands  Clean your hands whenever your hands are visibly dirty, before you eat, before or after touching your mouth, nose or eyes, and before preparing food. Clean them after contact with body fluids, using the restroom, touching animals or changing diapers. When washing hands, wet them with warm water and work up a lather. Rub hands for at least 15 seconds, then rinse them and pat them dry with a clean towel or paper towel. When using hand sanitizers, it should take about 15 seconds to rub your hands dry. If not, you probably didn't apply enough . Cover Your Sneeze or Cough  Germs are released into the air whenever you sneeze or cough.  To prevent the spread of infection:  Turn away from other people before coughing or sneezing. Cover your mouth or nose with a tissue when you cough or sneeze. Put the tissue in the trash. If you don't have a tissue, cough or sneeze into your upper sleeve, not your hands. Always clean your hands after coughing or sneezing. Care for Wounds  Your skin is your body's first line of defense against germs, but an open wound leaves an easy way for germs to enter your body. To prevent infection:  Clean your hands before and after changing wound dressings, and wear gloves to change dressings if recommended by your doctor. Take special care with IV lines or other devices inserted into the body. If you must touch them, clean your hands first.  Follow any specific instructions from your doctor to care for your wounds. Contact your doctor if you experience any signs of infection, such as fever or increased redness at the surgical or wound site. Keep a Clean Home  Clean or wipe commonly touched hard surfaces like door handles, sinks, tabletops, phones and TV remotes. Use products labeled \"disinfectant\" to kill harmful bacteria and viruses. Use a clean cloth or paper towel to clean and dry surfaces. Wiping surfaces with a dirty dishcloth, sponge or towel will only spread germs. Never share toothbrushes, davis, drinking glasses, utensils, razor blades, face cloths or bath towels to avoid spreading germs. Be sure that the linens that you sleep on are clean. Keep pets away from wounds and wash your hands after touching pets, their toys or bedding. We care about you and your health. Remember, preventing infections is a   team effort between you, your family, friends and health care providers. Postpartum Support    PARENTS:  Are you feeling sad or depressed? Is it difficult for you to enjoy yourself? Do you feel more irritable or tense? Do you feel anxious or panicky? Are you having difficulty bonding with your baby?  Do you feel as if you are \"out of control\" or \"going crazy\"? Are you worried that you might hurt your baby or yourself? FAMILIES: Do you worry that something is wrong but don't know how to help? Do you think that your partner or spouse is having problems coping? Are you worried that it may never get better? While many women experience some mild mood change or \"the blues\" during or after the birth of a child, 1 in 9 women experience more significant symptoms of depression or anxiety. 1 in 10 Dads become depressed during the first year. Things you can do  Being a good parent includes taking care of yourself. If you take care of yourself, you will be able to take better care of your baby and your family. Talk to a counselor or healthcare provider who has training in  mood and anxiety problems. Learn as much as you can about pregnancy and postpartum depression and anxiety. Get support from family and friends. Ask for help when you need it. Join a support group in your area or online. Keep active by walking, stretching or whatever form of exercise helps you to feel better. Get enough rest and time for yourself. Eat a healthy diet. Don't give up! It may take more than one try to get the right help you need. These are general instructions for a healthy lifestyle:    No smoking/ No tobacco products/ Avoid exposure to second hand smoke    Surgeon General's Warning:  Quitting smoking now greatly reduces serious risk to your health.     Obesity, smoking, and sedentary lifestyle greatly increases your risk for illness    A healthy diet, regular physical exercise & weight monitoring are important for maintaining a healthy lifestyle    Recognize signs and symptoms of STROKE:    F-face looks uneven    A-arms unable to move or move unevenly    S-speech slurred or non-existent    T-time-call 911 as soon as signs and symptoms begin - DO NOT go       back to bed or wait to see if you get better - TIME IS BRAIN.      I have had the opportunity to make my options or choices for discharge. I have received and understand these instructions.

## 2022-09-19 NOTE — ROUTINE PROCESS
Bedside and Verbal shift change report given to GIANA Coronel RN (oncoming nurse) by Terrance White RN (offgoing nurse). Report included the following information SBAR, Kardex, Intake/Output, MAR, and Accordion.

## 2022-09-19 NOTE — DISCHARGE SUMMARY
Obstetrical Discharge Summary     Name: Iliana Jarvis MRN: [id number]  SSN: [id number]    YOB: 1980  Age: 46 y.o. Sex: female      Admit Date: 9/16/2022    Discharge Date: Sept 20     Attending Physician:  Sabino Graves DO     Delivering Physician:  Laurita Eid MD    * Admission Diagnoses:   IUP @ 39w6d  Hx CVA, prothrombin G mutation on lovenox  BPP 6/10      * Discharge Diagnoses:   Delivery of a VFI via LTCS by Laurita Eid MD on 9/19/2022. Apgars were 9 and 9    NRFS, failed induction      Additional Diagnoses:   Hospital Problems as of 9/19/2022 Never Reviewed            Codes Class Noted - Resolved POA    * (Principal) Non-reassuring electronic fetal monitoring tracing ICD-10-CM: N63.2107  ICD-9-CM: 659.70  9/18/2022 - Present No        Pregnancy ICD-10-CM: Z34.90  ICD-9-CM: V22.2  9/16/2022 - Present Yes          Lab Results   Component Value Date/Time    Rubella, External immune 02/16/2022 12:00 AM    GrBStrep, External negative 08/24/2022 12:00 AM      There is no immunization history for the selected administration types on file for this patient. * Procedures:   LTCS         * Discharge Condition: [facility] Course: Normal hospital course following the delivery. * Disposition: Home    Discharge Medications:   Current Discharge Medication List          * Follow-up Care/Patient Instructions:   Activity: Activity as tolerated  Diet: Regular Diet  Wound Care: As directed      Followup 6 weeks for PP check        Signed By:  Martina Meehan MD     September 19, 2022

## 2022-09-19 NOTE — PROGRESS NOTES
PostPartum Note    Perico Zarate  [id number]  1980  46 y.o.    S:  Ms. Perico Zarate is a 46 y.o.  POD #1 s/p LTCS @ 39w6d. Doing well. She had a baby girl. Her lochia is like a period. She describes her pain as mild and is well controlled with PO medications. She is breast feeding and this is going well. She is ambulating and voiding. Tolerating PO intake. O:   Visit Vitals  /75   Pulse (!) 57   Temp 98 °F (36.7 °C)   Resp 16   Ht 5' 5\" (1.651 m)   Wt 94.3 kg (208 lb)   SpO2 98%   Breastfeeding Unknown   BMI 34.61 kg/m²       Lab Results   Component Value Date/Time    WBC 12.1 (H) 2022 02:40 AM    HGB 10.8 (L) 2022 02:40 AM    HCT 31.9 (L) 2022 02:40 AM    PLATELET 316 (L)  02:40 AM    MCV 94.4 2022 02:40 AM       Gen - No acute distress  Abdomen - Fundus firm, below the umbilicus, incision c/d/i   Ext - Warm, well perfused. Nontender    A/P:  POD #1 s/p LTCS @ 39w6d doing well. - restart lovenox this evening at 24hrs  1. Routine PP instructions/ care discussed  2. Blood type - Rh pos  3. Rubella imm  4. Circumcision n/a   5. Discharge home POD2-3   6. F/U 4-6 weeks for PP check.       Aliza Wilde MD  [facility]

## 2022-09-19 NOTE — PROGRESS NOTES
0430- Pt ambulates with RN in room. Complains of some shakiness. Denies nausea or lightheadness. 0500- 250 cc urine emptied from ledezma catheter. Ledezma catheter will stay in at this time. Pt continues to receive IV fluids. RN encourages po fluids. Tolerating ice chips at this time.

## 2022-09-19 NOTE — ROUTINE PROCESS
Bedside and Verbal shift change report given to HUI Perdue (oncoming nurse) by RICCI Horn [address] [place] [address] Dr Mahajan, RN (offgoing nurse). Report included the following information SBAR, Kardex, Intake/Output, and MAR.

## 2022-09-19 NOTE — ROUTINE PROCESS
SBAR IN Report: Mother    Verbal report received from NEGRO Beasley RN (full name & credentials) on this patient, who is now being transferred from L&D (unit) for routine progression of care. Report consisted of patient's Situation, Background, Assessment and Recommendations (SBAR).  ID bands were compared with the identification form, and verified with the patient and transferring nurse. Information from the SBAR, Kardex, Intake/Output, MAR, and Accordion and the Jasvir Report was reviewed with the transferring nurse; opportunity for questions and clarification provided.

## 2022-09-19 NOTE — LACTATION NOTE
This note was copied from a baby's chart. Mom had several feeds before infant admitted to NICU. Mom's goal for feeding is exclusive pumping. [employer] pump set up with instruction and mom assisted with nursing session. Mom taught to pump every 3 hours for stimulation. Hands on pumping encouraged. Parents shown how to hand express colostrum to provide for infant. Supplies, including syringes to bedside. Mom aware she can call for [phone] assistance at any time. Lots of emotional support provided. Discussed with mother her plan for feeding. Reviewed the benefits of exclusive breast milk feeding during the hospital stay. Informed her of the risks of using formula to supplement in the first few days of life as well as the benefits of successful breast milk feeding; referred her to the Breastfeeding booklet about this information. She acknowledges understanding of information reviewed and states that it is her plan to exclusively pump for her infant. Will support her choice and offer additional information as needed. Infant admitted to NICU. Pt will successfully establish breast milk supply by pumping with a hospital grade pump every 2-3 hours for approximately 20 minutes/8-10 x day. To maximize milk production mom taught to incorporate breast massage before and hand expression after each pumping session. All expressed breast milk (EBM) will be provided for infant(s) use. The value of skin to skin bonding emphasized. The breast will be offered as baby is ready; with the goal of eventual transition to breastfeeding. Importance of maintaining milk supply through pumping emphasized as infant(s) learns to nurse. Pumping:  Guidelines for pumping, milk collection and storage, proper cleaning of pump parts all reviewed. How to establish and maintain breast milk supply through pumping reviewed. Differences between hospital grade rental pumps vs store bought double electric/hand pumps discussed.   Set up pumping with double electric set up. Assisted with pump session. Pt will successfully establish breastfeeding by feeding in response to early feeding cues   or wake every 3h, will obtain deep latch, and will keep log of feedings/output. Taught to BF at hunger cues and or q 2-3 hrs and to offer 10-20 drops of hand expressed colostrum at any non-feeds.       Breast Assessment  Left Breast: Medium  Left Nipple: Everted, Intact  Right Breast: Medium  Right Nipple: Everted, Intact  Breast- Feeding Assessment  Attends Breast-Feeding Classes: No  Breast-Feeding Experience: No  Breast Trauma/Surgery: No  Type/Quality: Good (per mother)  Lactation Consultant Visits  Breast-Feedings:  (baby in NICU)  Mother/Infant Observation  Mother Observation: Breast comfortable  Infant Observation:  (baby in NICU)

## 2022-09-20 VITALS
HEIGHT: 65 IN | OXYGEN SATURATION: 98 % | HEART RATE: 87 BPM | SYSTOLIC BLOOD PRESSURE: 104 MMHG | BODY MASS INDEX: 34.66 KG/M2 | TEMPERATURE: 97.9 F | WEIGHT: 208 LBS | RESPIRATION RATE: 16 BRPM | DIASTOLIC BLOOD PRESSURE: 71 MMHG

## 2022-09-20 PROCEDURE — 74011250637 HC RX REV CODE- 250/637: Performed by: OBSTETRICS & GYNECOLOGY

## 2022-09-20 PROCEDURE — 77030036554

## 2022-09-20 RX ADMIN — IBUPROFEN 800 MG: 800 TABLET, FILM COATED ORAL at 07:26

## 2022-09-20 NOTE — PROGRESS NOTES
Post-Operative  Day 2    Rishi Watts     Information for the patient's :  Clarita Arellano, Female Xavier Patient [[id number]]   , Low Transverse  Patient doing well without unusual complaints. Abdalla out, waiting to void  Requests discharge home    Vitals:  Visit Vitals  /71 (BP 1 Location: Right upper arm, BP Patient Position: At rest)   Pulse 87   Temp 97.9 °F (36.6 °C)   Resp 16   Ht 5' 5\" (1.651 m)   Wt 94.3 kg (208 lb)   SpO2 98%   Breastfeeding Unknown   BMI 34.61 kg/m²     Temp (24hrs), Av °F (36.7 °C), Min:97.9 °F (36.6 °C), Max:98.2 °F (36.8 °C)        Exam:      Patient without distress. Abdomen: soft, expected tenderness, fundus firm                Incision clean, dry and intact               Lower extremities are nontender . with/without edema. Labs:   Lab Results   Component Value Date/Time    WBC 12.1 (H) 2022 02:40 AM    WBC 9.8 2022 04:11 PM    HGB 10.8 (L) 2022 02:40 AM    HGB 12.0 2022 04:11 PM    HCT 31.9 (L) 2022 02:40 AM    HCT 34.9 (L) 2022 04:11 PM    PLATELET 020 (L)  02:40 AM    PLATELET 298  04:11 PM       No results found for this or any previous visit (from the past 24 hour(s)). Assessment: Post-Op day 2, doing well      Plan:   1. Routine post-operative care  2. Requests discharge home, stable w/ OTC meds, Rx NSAIDs OTC reviewed  3. Baby at [facility], transferred given seizures. Mom plans to join family there. 4. H/o CVA/prothrombin gene mutation, on lovenox. Cont x6wks and has Rx at home.

## 2022-09-20 NOTE — LACTATION NOTE
Mother for discharge today. Baby was transferred to [facility]. Mother has been pumping Q 2-3 hours to help stimulate her breast milk supply. Mother has a [employer] pump for home use. Mother considering renting a breast pump. She is currently getting a few ml of colostrum per pump session. Mother given extra syringes and bottles to collect her breast milk.  also discussed the following:    Discussed eating a healthy diet. Instructed mother to eat a variety of foods in order to get a well balanced diet. She should consume an extra 500 calories per day (more than her non-pregnant requirement.) These extra calories will help provide energy needed for optimal breast milk production. Mother also encouraged to \"drink to thirst\" and it is recommended that she drink fluids such as water, fruit/vegetable juice. Nutritious snacks should be available so that she can eat throughout the day to help satisfy her hunger and maintain a good milk supply. Pumping:  Guidelines for pumping, milk collection and storage, proper cleaning of pump parts all reviewed. How to establish and maintain breast milk supply through pumping reviewed. Differences between hospital grade rental pumps vs store bought double electric/hand pumps discussed. Set up pumping with double electric set up. Assisted with pump session. List of area pump rental locations and lactation support services provided. Infant admitted to [address] [place] [address]. Mother will successfully establish breast milk supply by pumping with a hospital grade pump every 2-3 hours for approximately 20 minutes/8-10 x day. To maximize milk production mom taught to incorporate breast massage before and hand expression after each pumping session. All expressed breast milk (EBM) will be provided for infant(s) use. The value of skin to skin bonding emphasized. The breast will be offered as baby is ready; with the goal of eventual transition to breastfeeding.  Importance of maintaining milk supply through pumping emphasized as infant(s) learns to nurse. Discussed what to do if she gets engorged:  Engorgement Care Guidelines:  Reviewed how milk is made and normal phases of milk production. Taught care of engorged breasts - frequent breastfeeding encouraged, warm compress before pumping and cool packs afterwards as tolerated. Anticipatory guidance shared. Mother states she is on the following medications which were looked up in [facility] Mother's Milk:  Lovenox L2  Baby Aspirin L2  Zyrtec L2    Mother given handouts to review with her physician and baby's physician. Mother given breastfeeding handouts and extra breastfeeding supplies. Encouraged mother to call lactation services for any breastfeeding / pumping concerns.

## 2022-09-20 NOTE — ROUTINE PROCESS
1860 Patient signed hard copy of discharge instructions due to electronic signature pad not working. 80 Pt off unit in stable condition via wheelchair with volunteers for discharge home per Dr. Roger Rayo. Pt is aware to follow-up 2 week incision check and 1 month. Pt denies any HA, dizziness, N/V or pain at this time. Infant in car seat and discharged with mother.

## 2022-10-03 NOTE — ANESTHESIA POSTPROCEDURE EVALUATION
Procedure(s):   SECTION. spinal       Patient discharged by PACU nursing without anesthesiologist evaluation.         <[email or web address]>    INITIAL Post-op Vital signs:   Vitals Value Taken Time   /76 22   Temp 36.6 °C (97.9 °F) 22   Pulse 83 22   Resp 16 22   SpO2 97 % 22

## (undated) DEVICE — POWDER HEMOSTAT GEL 3.0GR -- SURGICEL

## (undated) DEVICE — SUTURE MCRYL SZ 0 L36IN ABSRB UD L36MM CT-1 1/2 CIR Y946H

## (undated) DEVICE — TOTAL TRAY, 16FR 10ML SIL FOLEY, URN: Brand: MEDLINE

## (undated) DEVICE — PREP SKN CHLRAPRP APL 26ML STR --

## (undated) DEVICE — SYRINGE IRRIG 60ML SFT PLIABLE BLB EZ TO GRP 1 HND USE W/

## (undated) DEVICE — SOLUTION IRRIG 1000ML 0.9% SOD CHL USP POUR PLAS BTL

## (undated) DEVICE — LARGE, DISPOSABLE ALEXIS O C-SECTION PROTECTOR - RETRACTOR: Brand: ALEXIS ® O C-SECTION PROTECTOR - RETRACTOR

## (undated) DEVICE — SUTURE MCRYL SZ 4 0 L18IN ABSRB VLT PS 1 L24MM 3 8 CIR REV Y682H

## (undated) DEVICE — STERILE POLYISOPRENE POWDER-FREE SURGICAL GLOVES WITH EMOLLIENT COATING: Brand: PROTEXIS

## (undated) DEVICE — HANDLE LT SNAP ON ULT DURABLE LENS FOR TRUMPF ALC DISPOSABLE

## (undated) DEVICE — DERMABOND SKIN ADH 0.7ML -- DERMABOND ADVANCED 12/BX

## (undated) DEVICE — TOWEL,OR,DSP,ST,BLUE,STD,2/PK,40PK/CS: Brand: MEDLINE

## (undated) DEVICE — REM POLYHESIVE ADULT PATIENT RETURN ELECTRODE: Brand: VALLEYLAB

## (undated) DEVICE — GLOVE SURG SZ 7 L12IN FNGR THK79MIL GRN LTX FREE

## (undated) DEVICE — C-SECTION II-LF: Brand: MEDLINE INDUSTRIES, INC.

## (undated) DEVICE — SUTURE VCRL SZ 2-0 L27IN ABSRB VLT L40MM CT 1/2 CIR J351H

## (undated) DEVICE — GLOVE SURG SZ 6 THK91MIL LTX FREE SYN POLYISOPRENE ANTI

## (undated) DEVICE — TIP CLEANER: Brand: VALLEYLAB

## (undated) DEVICE — SOLUTION IRRIG 1000ML STRL H2O USP PLAS POUR BTL

## (undated) DEVICE — ROCKER SWITCH PENCIL HOLSTER: Brand: VALLEYLAB

## (undated) DEVICE — SUTURE VCRL SZ 0 L36IN ABSRB VLT L40MM CT 1/2 CIR J358H

## (undated) DEVICE — SOLIDIFIER FLUID 3000 CC ABSORB

## (undated) DEVICE — GARMENT,MEDLINE,DVT,INT,CALF,LG, GEN2: Brand: MEDLINE

## (undated) DEVICE — GLOVE ORANGE PI 8   MSG9080

## (undated) DEVICE — GOWN,AURORA,FABRIC-REINFORCED,X-LARGE: Brand: MEDLINE